# Patient Record
Sex: FEMALE | Race: WHITE | NOT HISPANIC OR LATINO | Employment: OTHER | URBAN - METROPOLITAN AREA
[De-identification: names, ages, dates, MRNs, and addresses within clinical notes are randomized per-mention and may not be internally consistent; named-entity substitution may affect disease eponyms.]

---

## 2022-04-07 ENCOUNTER — HISTORICAL (OUTPATIENT)
Dept: ADMINISTRATIVE | Facility: HOSPITAL | Age: 72
End: 2022-04-07

## 2022-04-23 VITALS
WEIGHT: 123.25 LBS | SYSTOLIC BLOOD PRESSURE: 137 MMHG | DIASTOLIC BLOOD PRESSURE: 75 MMHG | BODY MASS INDEX: 21.04 KG/M2 | HEIGHT: 64 IN

## 2023-04-18 ENCOUNTER — OFFICE VISIT (OUTPATIENT)
Dept: NEUROLOGY | Facility: CLINIC | Age: 73
End: 2023-04-18
Payer: MEDICARE

## 2023-04-18 VITALS
HEART RATE: 70 BPM | WEIGHT: 123 LBS | SYSTOLIC BLOOD PRESSURE: 138 MMHG | BODY MASS INDEX: 21 KG/M2 | HEIGHT: 64 IN | DIASTOLIC BLOOD PRESSURE: 72 MMHG

## 2023-04-18 DIAGNOSIS — G90.3 NEUROGENIC ORTHOSTATIC HYPOTENSION: Primary | ICD-10-CM

## 2023-04-18 DIAGNOSIS — F32.A DEPRESSION, UNSPECIFIED DEPRESSION TYPE: ICD-10-CM

## 2023-04-18 PROCEDURE — 99999 PR PBB SHADOW E&M-EST. PATIENT-LVL V: ICD-10-PCS | Mod: PBBFAC,,, | Performed by: PSYCHIATRY & NEUROLOGY

## 2023-04-18 PROCEDURE — 99214 OFFICE O/P EST MOD 30 MIN: CPT | Mod: S$PBB,,, | Performed by: PSYCHIATRY & NEUROLOGY

## 2023-04-18 PROCEDURE — 99999 PR PBB SHADOW E&M-EST. PATIENT-LVL V: CPT | Mod: PBBFAC,,, | Performed by: PSYCHIATRY & NEUROLOGY

## 2023-04-18 PROCEDURE — 99214 PR OFFICE/OUTPT VISIT, EST, LEVL IV, 30-39 MIN: ICD-10-PCS | Mod: S$PBB,,, | Performed by: PSYCHIATRY & NEUROLOGY

## 2023-04-18 PROCEDURE — 99215 OFFICE O/P EST HI 40 MIN: CPT | Mod: PBBFAC | Performed by: PSYCHIATRY & NEUROLOGY

## 2023-04-18 RX ORDER — LANSOPRAZOLE 30 MG/1
CAPSULE, DELAYED RELEASE ORAL
COMMUNITY

## 2023-04-18 RX ORDER — GABAPENTIN 100 MG/1
CAPSULE ORAL
COMMUNITY

## 2023-04-18 RX ORDER — CHOLECALCIFEROL (VITAMIN D3) 25 MCG
2000 TABLET ORAL DAILY
COMMUNITY

## 2023-04-18 RX ORDER — MOXIFLOXACIN 5 MG/ML
1 SOLUTION/ DROPS OPHTHALMIC 4 TIMES DAILY
COMMUNITY
Start: 2023-02-20

## 2023-04-18 RX ORDER — ESTRADIOL 0.05 MG/D
PATCH TRANSDERMAL
COMMUNITY

## 2023-04-18 RX ORDER — FLUOCINOLONE ACETONIDE 0.11 MG/ML
3 OIL AURICULAR (OTIC) NIGHTLY
COMMUNITY
Start: 2023-01-16

## 2023-04-18 RX ORDER — OFLOXACIN 3 MG/ML
SOLUTION AURICULAR (OTIC)
COMMUNITY

## 2023-04-18 RX ORDER — ESTRADIOL 0.1 MG/G
CREAM VAGINAL
COMMUNITY

## 2023-04-18 RX ORDER — FLUDROCORTISONE ACETATE 0.1 MG/1
100 TABLET ORAL DAILY
COMMUNITY
Start: 2023-01-16

## 2023-04-18 RX ORDER — VALACYCLOVIR HYDROCHLORIDE 1 G/1
TABLET, FILM COATED ORAL
COMMUNITY

## 2023-04-18 RX ORDER — FLUTICASONE PROPIONATE 50 MCG
SPRAY, SUSPENSION (ML) NASAL
COMMUNITY

## 2023-04-18 RX ORDER — BUPROPION HYDROCHLORIDE 150 MG/1
150 TABLET ORAL DAILY
Qty: 30 TABLET | Refills: 5 | Status: SHIPPED | OUTPATIENT
Start: 2023-04-18 | End: 2023-07-17

## 2023-04-18 RX ORDER — ROSUVASTATIN CALCIUM 10 MG/1
10 TABLET, COATED ORAL
COMMUNITY
Start: 2023-01-30

## 2023-04-18 RX ORDER — DICLOFENAC SODIUM 10 MG/G
GEL TOPICAL
COMMUNITY

## 2023-04-18 RX ORDER — TRAZODONE HYDROCHLORIDE 50 MG/1
TABLET ORAL
COMMUNITY
Start: 2023-02-20

## 2023-04-18 RX ORDER — EZETIMIBE 10 MG/1
TABLET ORAL
COMMUNITY

## 2023-04-18 RX ORDER — PHENAZOPYRIDINE HYDROCHLORIDE 200 MG/1
TABLET, FILM COATED ORAL
COMMUNITY

## 2023-04-18 RX ORDER — ESCITALOPRAM OXALATE 5 MG/1
5 TABLET ORAL DAILY
COMMUNITY
Start: 2023-01-30

## 2023-04-18 RX ORDER — PREDNISOLONE ACETATE 10 MG/ML
SUSPENSION/ DROPS OPHTHALMIC
COMMUNITY
Start: 2023-03-06

## 2023-04-18 RX ORDER — LIFITEGRAST 50 MG/ML
1 SOLUTION/ DROPS OPHTHALMIC 2 TIMES DAILY
COMMUNITY
Start: 2023-02-20

## 2023-04-18 RX ORDER — ALENDRONATE SODIUM 70 MG/1
70 TABLET ORAL
COMMUNITY
Start: 2023-04-13

## 2023-04-18 RX ORDER — LEVOTHYROXINE SODIUM 112 UG/1
112 TABLET ORAL
COMMUNITY
Start: 2023-01-16

## 2023-04-18 RX ORDER — ZOLPIDEM TARTRATE 5 MG/1
TABLET ORAL NIGHTLY
COMMUNITY

## 2023-04-18 RX ORDER — ASPIRIN 81 MG/1
81 TABLET ORAL
COMMUNITY

## 2023-04-18 NOTE — PROGRESS NOTES
Chief Complaint   Patient presents with    Autonomic dysfunction     Pt denies progression of symptoms such as brain fog some days worse than others dizziness denies change         This is a 72 y.o. female here for follow up for autonomic dysfunction.  She reports she feels about the same as last visit.  She still has difficulty with walking  She denies any recent falls.  She never started Northera as it was going to be too expensive.  She remains on Florinef.  She denies any urinary incontinence.  Denies tremor.  Denies memory changes.  She has both good days and bad days.  Some days she goes on outings and has no difficulty with walking and other days she feels fatigued and has to leave.  She uses a walking stick most of the time now.         Vitals:    04/18/23 1408   BP: 138/72   Pulse: 70        General: alert and oriented, No acute distress, no audible wheezes pulse intact. No edema, DIP swelling      Cognition and Comprehension  Speech and language intact.  follow commands  speech fluent  attention intact  memory for recent events intact from history taking  affect pleasant  fund of knowledge adequate      hypophonia      Cranial Nerves  II. Optic: Visual fields (Full to confrontation both eyes).  III, IV, VI. Oculomotor: Intact, Pupils equal, round and reactive to light, no nystagmus  V. Trigeminal: Sensation of light touch (Normal)  VII. Facial: no facial asymmetry.  VIII, hearing intact to spoken voice  IX/X. Glossopharyngeal/ Vagus: Voice (normal).  XI. shoulder shrug normal  XII. Hypoglossal: Intact.      Muscle Strength & Tone  Normal upper extremity tone,  Normal lower extremity tone.  Normal upper extremity strength  normal lower extremity strength              Coordination and Gait  Coordination and gait are normal.    1. Neurogenic orthostatic hypotension       Encourage salt intake and hydration.  Continue electrolyte drinks like gatorade or propel  Trial of wellbutrin  Rec autonomic consult but  deferred

## 2023-07-17 ENCOUNTER — OFFICE VISIT (OUTPATIENT)
Dept: NEUROLOGY | Facility: CLINIC | Age: 73
End: 2023-07-17
Payer: MEDICARE

## 2023-07-17 VITALS
BODY MASS INDEX: 21.17 KG/M2 | SYSTOLIC BLOOD PRESSURE: 157 MMHG | DIASTOLIC BLOOD PRESSURE: 78 MMHG | HEIGHT: 64 IN | HEART RATE: 59 BPM | WEIGHT: 124 LBS

## 2023-07-17 DIAGNOSIS — G90.3 NEUROGENIC ORTHOSTATIC HYPOTENSION: ICD-10-CM

## 2023-07-17 PROCEDURE — 99999 PR PBB SHADOW E&M-EST. PATIENT-LVL IV: ICD-10-PCS | Mod: PBBFAC,,, | Performed by: NURSE PRACTITIONER

## 2023-07-17 PROCEDURE — 99999 PR PBB SHADOW E&M-EST. PATIENT-LVL IV: CPT | Mod: PBBFAC,,, | Performed by: NURSE PRACTITIONER

## 2023-07-17 PROCEDURE — 99214 OFFICE O/P EST MOD 30 MIN: CPT | Mod: PBBFAC | Performed by: NURSE PRACTITIONER

## 2023-07-17 PROCEDURE — 99214 OFFICE O/P EST MOD 30 MIN: CPT | Mod: S$PBB,,, | Performed by: NURSE PRACTITIONER

## 2023-07-17 PROCEDURE — 99214 PR OFFICE/OUTPT VISIT, EST, LEVL IV, 30-39 MIN: ICD-10-PCS | Mod: S$PBB,,, | Performed by: NURSE PRACTITIONER

## 2023-07-17 RX ORDER — PYRIDOSTIGMINE BROMIDE 60 MG/1
30 TABLET ORAL 2 TIMES DAILY
Qty: 30 TABLET | Refills: 2 | Status: SHIPPED | OUTPATIENT
Start: 2023-07-17 | End: 2023-09-13 | Stop reason: SDUPTHER

## 2023-07-17 RX ORDER — CHOLECALCIFEROL (VITAMIN D3) 125 MCG
1 CAPSULE ORAL DAILY
COMMUNITY

## 2023-07-17 RX ORDER — MECLIZINE HYDROCHLORIDE 25 MG/1
25 TABLET ORAL 3 TIMES DAILY PRN
COMMUNITY

## 2023-07-17 NOTE — PROGRESS NOTES
Subjective:       Patient ID: Elva Alas is a 72 y.o. female.    Chief Complaint: Neurogenic orthostatic hypotension (Still having balance issues. Loses her balance. While walking favors her left side. Denies any recent fall. Uses cane to ambulate. At times gets lightheaded, when she stands up quickly. Also, while riding in a care she gets lightheaded. ) and Depression (Stop taking Wellbutrin due to developing hives. States felt better on Wellbutrin, had more energy.  Only taking Lexapro 5 mg daily.)      History of Present Illness:  This is a 72 y.o. female here for follow up for autonomic dysfunction.  She reports she feels about the same as last visit.  She still has difficulty with walking  She denies any recent falls.  She remains on Florinef.  She denies any urinary incontinence.  Denies tremor.  Denies memory changes.  She has both good days and bad days.  Some days she goes on outings and has no difficulty with walking and other days she feels fatigued and has to leave.  She uses a walking stick most of the time now.  She was started on Wellbutrin at last visit, but developed an atypical hives-like rash about 2 weeks after starting it so it was discontinued.  She has an appt with an immunologist soon because of the atypical nature of the rash      Review of Systems  I have reviewed a 12 point ROS which is negative unless otherwise stated in HPI        Past Medical History:   Diagnosis Date    Autonomic dysfunction     Depression     Deviated septum     History of repair of retinal tear by laser photocoagulation     Hyperlipidemia     Hypothyroid     Insomnia        Past Surgical History:   Procedure Laterality Date    ADRENALECTOMY      EYE SURGERY      FOOT SURGERY Left     HEMORRHOID SURGERY      HYSTERECTOMY          Family History   Problem Relation Age of Onset    Aneurysm Mother     Pulmonary embolism Mother     Cancer Father     Stroke Sister     Cancer Sister         Social History      Socioeconomic History    Marital status:    Tobacco Use    Smoking status: Former     Types: Cigarettes    Smokeless tobacco: Never   Substance and Sexual Activity    Alcohol use: Yes     Comment: Occasionally    Drug use: Never        Outpatient Encounter Medications as of 2023   Medication Sig Dispense Refill    diclofenac sodium (VOLTAREN) 1 % Gel diclofenac 1 % topical gel   APPLY 1 GRAM TO PAINFUL AREA FOUR TIMES DAILY AS NEEDED      EScitalopram oxalate (LEXAPRO) 5 MG Tab Take 5 mg by mouth once daily.      fludrocortisone (FLORINEF) 0.1 mg Tab Take 100 mcg by mouth once daily.      fluocinolone acetonide oiL 0.01 % Drop Place 3 drops into both ears every evening.      fluticasone propionate (FLONASE) 50 mcg/actuation nasal spray Flonase Allergy Relief 50 mcg/actuation nasal spray,suspension   Inhale 2 sprays every day by intranasal route.      lactase (LACTAID) 3,000 unit tablet Take 1 tablet by mouth once daily.      levothyroxine (SYNTHROID) 112 MCG tablet Take 112 mcg by mouth before breakfast.      meclizine (MOTION SICKNESS, MECLIZINE,) 25 mg tablet Take 25 mg by mouth 3 (three) times daily as needed.      rosuvastatin (CRESTOR) 10 MG tablet Take 10 mg by mouth twice a week.      traZODone (DESYREL) 50 MG tablet SMARTSI-3 Tablet(s) By Mouth Every Evening PRN      vitamin D (VITAMIN D3) 1000 units Tab Take 2,000 Units by mouth once daily.      XIIDRA 5 % Dpet Place 1 drop into both eyes 2 (two) times daily.      alendronate (FOSAMAX) 70 MG tablet Take 70 mg by mouth every 7 days.      aspirin (ECOTRIN) 81 MG EC tablet Take 81 mg by mouth.      estradioL (ESTRACE) 0.01 % (0.1 mg/gram) vaginal cream Estrace 0.01% (0.1 mg/gram) vaginal cream      estradiol 0.05 mg/24 hr td ptwk 0.05 mg/24 hr PTWK Climara 0.05 mg/24 hr transdermal patch      ezetimibe (ZETIA) 10 mg tablet Zetia 10 mg tablet      gabapentin (NEURONTIN) 100 MG capsule gabapentin 100 mg capsule      lansoprazole (PREVACID) 30  "MG capsule lansoprazole 30 mg capsule,delayed release      moxifloxacin (VIGAMOX) 0.5 % ophthalmic solution Place 1 drop into the left eye 4 (four) times daily.      ofloxacin (FLOXIN) 0.3 % otic solution ofloxacin 0.3 % ear drops      phenazopyridine (PYRIDIUM) 200 MG tablet phenazopyridine 200 mg tablet      prednisoLONE acetate (PRED FORTE) 1 % DrpS Place into both eyes.      pyridostigmine (MESTINON) 60 mg Tab Take 0.5 tablets (30 mg total) by mouth 2 (two) times a day. 30 tablet 2    valACYclovir (VALTREX) 1000 MG tablet valacyclovir 1 gram tablet      zolpidem (AMBIEN) 5 MG Tab Take by mouth every evening. 0.5 tab      zoster vaccine live, PF, (ZOSTAVAX, PF,) 19,400 unit/0.65 mL injection Zostavax (PF) 19,400 unit/0.65 mL subcutaneous suspension      [DISCONTINUED] buPROPion (WELLBUTRIN XL) 150 MG TB24 tablet Take 1 tablet (150 mg total) by mouth once daily. (Patient not taking: Reported on 7/17/2023) 30 tablet 5     No facility-administered encounter medications on file as of 7/17/2023.      Objective:   BP (!) 157/78 (BP Location: Left arm) Comment: Orthostatic: Lying 155/81, P61, Sitting 154/83, P63, Standing 137/77, P 67  Pulse (!) 59   Ht 5' 4" (1.626 m)   Wt 56.2 kg (124 lb)   BMI 21.28 kg/m²        Physical Exam  NAD  alert and oriented  cognition and perception intact  no aphasia   hypophonia  EOMI  no facial asymmetry  no dysarthria  moves all extremities symmetrically  no gross coordination abnormalities  gait normal with cane      Assessment & Plan:      1. Neurogenic orthostatic hypotension  Assessment & Plan:  -Wellbutrin presumably caused severe rash.  Trial of mestinon.  Start at 30 mg BID    Orders:  -     pyridostigmine (MESTINON) 60 mg Tab; Take 0.5 tablets (30 mg total) by mouth 2 (two) times a day.  Dispense: 30 tablet; Refill: 2          This note was created with the assistance of voice recognition software. There may be transcription errors as a result of using this technology " however minimal. Effort has been made to assure accuracy of transcription but any obvious errors or omissions should be clarified with the author of the document.

## 2023-09-13 DIAGNOSIS — G90.3 NEUROGENIC ORTHOSTATIC HYPOTENSION: ICD-10-CM

## 2023-09-13 RX ORDER — PYRIDOSTIGMINE BROMIDE 60 MG/1
30 TABLET ORAL 2 TIMES DAILY
Qty: 30 TABLET | Refills: 2 | Status: SHIPPED | OUTPATIENT
Start: 2023-09-13 | End: 2024-01-08

## 2023-11-07 ENCOUNTER — OFFICE VISIT (OUTPATIENT)
Dept: NEUROLOGY | Facility: CLINIC | Age: 73
End: 2023-11-07
Payer: MEDICARE

## 2023-11-07 VITALS
HEART RATE: 72 BPM | WEIGHT: 124 LBS | DIASTOLIC BLOOD PRESSURE: 66 MMHG | SYSTOLIC BLOOD PRESSURE: 134 MMHG | HEIGHT: 64 IN | BODY MASS INDEX: 21.17 KG/M2

## 2023-11-07 DIAGNOSIS — G90.3 NEUROGENIC ORTHOSTATIC HYPOTENSION: Primary | ICD-10-CM

## 2023-11-07 PROCEDURE — 99999 PR PBB SHADOW E&M-EST. PATIENT-LVL IV: CPT | Mod: PBBFAC,,, | Performed by: NURSE PRACTITIONER

## 2023-11-07 PROCEDURE — 99999 PR PBB SHADOW E&M-EST. PATIENT-LVL IV: ICD-10-PCS | Mod: PBBFAC,,, | Performed by: NURSE PRACTITIONER

## 2023-11-07 PROCEDURE — 99213 OFFICE O/P EST LOW 20 MIN: CPT | Mod: S$PBB,,, | Performed by: NURSE PRACTITIONER

## 2023-11-07 PROCEDURE — 99214 OFFICE O/P EST MOD 30 MIN: CPT | Mod: PBBFAC | Performed by: NURSE PRACTITIONER

## 2023-11-07 PROCEDURE — 99213 PR OFFICE/OUTPT VISIT, EST, LEVL III, 20-29 MIN: ICD-10-PCS | Mod: S$PBB,,, | Performed by: NURSE PRACTITIONER

## 2023-11-07 NOTE — PROGRESS NOTES
Subjective:       Patient ID: Elva Alas is a 72 y.o. female.    Chief Complaint: Neurogenic orthostatic hypotension (Balance is much better since she started on pyridostigmine. States she was able to give up on her cane. Denies any falls. Also, did complete a session of PT for 7 weeks which has helped. States she exercise at home. )      History of Present Illness:  Follow-up visit for autonomic dysfunction.  She was started on a trial of Mestinon at last visit.  She is currently taking 30 mg b.i.d. and has had significant improvement in her symptoms.  Her orthostatics today were essentially unchanged with positions.  She denies any side effects since being on the Mestinon.  She specifically denies any GI upset.  She still has some very mild orthostasis symptoms when she bends down and stands up, but this is very much tolerable for her.  She is no longer having to use a cane.    Lying /70, pulse 76   Sitting /70, pulse 76  Standing /72, pulse 72        Review of Systems  I have reviewed a 12 point review of systems which is negative unless otherwise stated in HPI        Past Medical History:   Diagnosis Date    Autonomic dysfunction     Depression     Deviated septum     History of repair of retinal tear by laser photocoagulation     Hyperlipidemia     Hypothyroid     Insomnia        Past Surgical History:   Procedure Laterality Date    ADRENALECTOMY      EYE SURGERY      FOOT SURGERY Left     HEMORRHOID SURGERY      HYSTERECTOMY          Family History   Problem Relation Age of Onset    Aneurysm Mother     Pulmonary embolism Mother     Cancer Father     Stroke Sister     Cancer Sister         Social History     Socioeconomic History    Marital status:    Tobacco Use    Smoking status: Former     Types: Cigarettes    Smokeless tobacco: Never   Substance and Sexual Activity    Alcohol use: Yes     Comment: Occasionally    Drug use: Never        Outpatient Encounter Medications as  of 2023   Medication Sig Dispense Refill    diclofenac sodium (VOLTAREN) 1 % Gel diclofenac 1 % topical gel   APPLY 1 GRAM TO PAINFUL AREA FOUR TIMES DAILY AS NEEDED      EScitalopram oxalate (LEXAPRO) 5 MG Tab Take 5 mg by mouth once daily.      fludrocortisone (FLORINEF) 0.1 mg Tab Take 100 mcg by mouth once daily.      fluocinolone acetonide oiL 0.01 % Drop Place 3 drops into both ears every evening.      fluticasone propionate (FLONASE) 50 mcg/actuation nasal spray Flonase Allergy Relief 50 mcg/actuation nasal spray,suspension   Inhale 2 sprays every day by intranasal route.      lactase (LACTAID) 3,000 unit tablet Take 1 tablet by mouth once daily.      levothyroxine (SYNTHROID) 112 MCG tablet Take 112 mcg by mouth before breakfast.      meclizine (MOTION SICKNESS, MECLIZINE,) 25 mg tablet Take 25 mg by mouth 3 (three) times daily as needed.      pyridostigmine (MESTINON) 60 mg Tab Take 0.5 tablets (30 mg total) by mouth 2 (two) times a day. 30 tablet 2    rosuvastatin (CRESTOR) 10 MG tablet Take 10 mg by mouth twice a week.      traZODone (DESYREL) 50 MG tablet SMARTSI-3 Tablet(s) By Mouth Every Evening PRN      vitamin D (VITAMIN D3) 1000 units Tab Take 2,000 Units by mouth once daily.      XIIDRA 5 % Dpet Place 1 drop into both eyes 2 (two) times daily.      alendronate (FOSAMAX) 70 MG tablet Take 70 mg by mouth every 7 days.      aspirin (ECOTRIN) 81 MG EC tablet Take 81 mg by mouth.      estradioL (ESTRACE) 0.01 % (0.1 mg/gram) vaginal cream Estrace 0.01% (0.1 mg/gram) vaginal cream      estradiol 0.05 mg/24 hr td ptwk 0.05 mg/24 hr PTWK Climara 0.05 mg/24 hr transdermal patch      ezetimibe (ZETIA) 10 mg tablet Zetia 10 mg tablet      gabapentin (NEURONTIN) 100 MG capsule gabapentin 100 mg capsule      lansoprazole (PREVACID) 30 MG capsule lansoprazole 30 mg capsule,delayed release      moxifloxacin (VIGAMOX) 0.5 % ophthalmic solution Place 1 drop into the left eye 4 (four) times daily.       "ofloxacin (FLOXIN) 0.3 % otic solution ofloxacin 0.3 % ear drops      phenazopyridine (PYRIDIUM) 200 MG tablet phenazopyridine 200 mg tablet      prednisoLONE acetate (PRED FORTE) 1 % DrpS Place into both eyes.      valACYclovir (VALTREX) 1000 MG tablet valacyclovir 1 gram tablet      zolpidem (AMBIEN) 5 MG Tab Take by mouth every evening. 0.5 tab      zoster vaccine live, PF, (ZOSTAVAX, PF,) 19,400 unit/0.65 mL injection Zostavax (PF) 19,400 unit/0.65 mL subcutaneous suspension       No facility-administered encounter medications on file as of 11/7/2023.      Objective:   /66 (BP Location: Right arm)   Pulse 72   Ht 5' 4" (1.626 m)   Wt 56.2 kg (124 lb)   BMI 21.28 kg/m²        Physical Exam  General:  Alert and oriented  NAD  No overt edema    Cognition and Comprehension:  Speech and language intact  Follows commands  Hypophonia    Cranial nerves:   CN 2_ Visual fields (full to confrontation both eyes)  CN 3, 4, 6_ Intact, CONNIE, no nystagmus  CN 7_no face asymmetry; normal eye closure and smile  CN 8_hearing intact to spoken voice  CN 9, 10, 11_voice normal, shoulder shrug ok; deltoids not fatigable     Muscle Strength and Tone:  Normal upper extremity tone  Normal lower extremity tone  Normal upper extremity strength  Normal lower extremity strength    Coordination and Gait:  Stride length and pace normal, no instability with turn  No ataxia with FTN      Assessment & Plan:      1. Neurogenic orthostatic hypotension  Assessment & Plan:  -significant improvement with addition of Mestinon 30 mg b.i.d..  Recommend continuing at this dose for now  -RTC 6 months, but call for sooner appointment if needed            This note was created with the assistance of voice recognition software. There may be transcription errors as a result of using this technology however minimal. Effort has been made to assure accuracy of transcription but any obvious errors or omissions should be clarified with the author of the " document.

## 2023-11-07 NOTE — ASSESSMENT & PLAN NOTE
-significant improvement with addition of Mestinon 30 mg b.i.d..  Recommend continuing at this dose for now  -RTC 6 months, but call for sooner appointment if needed

## 2024-01-08 DIAGNOSIS — G90.3 NEUROGENIC ORTHOSTATIC HYPOTENSION: ICD-10-CM

## 2024-01-08 RX ORDER — PYRIDOSTIGMINE BROMIDE 60 MG/1
30 TABLET ORAL 2 TIMES DAILY
Qty: 30 TABLET | Refills: 2 | Status: SHIPPED | OUTPATIENT
Start: 2024-01-08

## 2024-03-12 DIAGNOSIS — G90.3 NEUROGENIC ORTHOSTATIC HYPOTENSION: ICD-10-CM

## 2024-03-12 RX ORDER — PYRIDOSTIGMINE BROMIDE 60 MG/1
30 TABLET ORAL 2 TIMES DAILY
Qty: 30 TABLET | Refills: 2 | Status: SHIPPED | OUTPATIENT
Start: 2024-03-12 | End: 2024-05-15 | Stop reason: SDUPTHER

## 2024-05-15 ENCOUNTER — OFFICE VISIT (OUTPATIENT)
Dept: NEUROLOGY | Facility: CLINIC | Age: 74
End: 2024-05-15
Payer: MEDICARE

## 2024-05-15 VITALS
BODY MASS INDEX: 20.35 KG/M2 | WEIGHT: 119.19 LBS | DIASTOLIC BLOOD PRESSURE: 60 MMHG | HEIGHT: 64 IN | SYSTOLIC BLOOD PRESSURE: 120 MMHG | HEART RATE: 80 BPM

## 2024-05-15 DIAGNOSIS — G90.3 NEUROGENIC ORTHOSTATIC HYPOTENSION: Primary | ICD-10-CM

## 2024-05-15 PROCEDURE — 99214 OFFICE O/P EST MOD 30 MIN: CPT | Mod: S$PBB,,, | Performed by: NURSE PRACTITIONER

## 2024-05-15 PROCEDURE — 99213 OFFICE O/P EST LOW 20 MIN: CPT | Mod: PBBFAC | Performed by: NURSE PRACTITIONER

## 2024-05-15 PROCEDURE — 99999 PR PBB SHADOW E&M-EST. PATIENT-LVL III: CPT | Mod: PBBFAC,,, | Performed by: NURSE PRACTITIONER

## 2024-05-15 RX ORDER — PYRIDOSTIGMINE BROMIDE 60 MG/1
30 TABLET ORAL 2 TIMES DAILY
Qty: 90 TABLET | Refills: 3 | Status: SHIPPED | OUTPATIENT
Start: 2024-05-15

## 2024-05-15 NOTE — PROGRESS NOTES
Subjective:       Patient ID: Elva Alas is a 73 y.o. female.    Chief Complaint: Autonomic dysfunction (Pt states frequent balance issues issues as well as some slight dizziness )      History of Present Illness:  Follow-up visit for autonomic dysfunction.  She is still on Mestinon 30 mg b.i.d..  She was still doing well until about 4 weeks ago when she began to experience more issues with balance difficulties and lightheadedness and dizziness.  She does recall drinking propel and it helped to improve her symptoms.  She became really depressed because of the exacerbation of her symptoms, but about a week ago, the symptoms began to returned to their baseline and she feels back to normal.  Her orthostatics were not positive today, but baseline blood pressure is a little bit lower than it was at her previous visit.  She is unsure what provoked the exacerbation.  Denies any illness.  Does report that she is sensitive to heat    Lying /60, pulse 74   Sitting /60, pulse 80  Standing /64, pulse 80            Past Medical History:   Diagnosis Date    Autonomic dysfunction     Depression     Deviated septum     History of repair of retinal tear by laser photocoagulation     Hyperlipidemia     Hypothyroid     Insomnia        Past Surgical History:   Procedure Laterality Date    ADRENALECTOMY      EYE SURGERY      FOOT SURGERY Left     HEMORRHOID SURGERY      HYSTERECTOMY          Family History   Problem Relation Name Age of Onset    Aneurysm Mother      Pulmonary embolism Mother      Cancer Father      Stroke Sister      Cancer Sister          Social History     Socioeconomic History    Marital status:    Tobacco Use    Smoking status: Former     Types: Cigarettes    Smokeless tobacco: Never   Substance and Sexual Activity    Alcohol use: Yes     Comment: Occasionally    Drug use: Never        Outpatient Encounter Medications as of 5/15/2024   Medication Sig Dispense Refill    EScitalopram  oxalate (LEXAPRO) 5 MG Tab Take 5 mg by mouth once daily.      fludrocortisone (FLORINEF) 0.1 mg Tab Take 100 mcg by mouth once daily.      fluocinolone acetonide oiL 0.01 % Drop Place 3 drops into both ears every evening.      fluticasone propionate (FLONASE) 50 mcg/actuation nasal spray Flonase Allergy Relief 50 mcg/actuation nasal spray,suspension   Inhale 2 sprays every day by intranasal route.      lactase (LACTAID) 3,000 unit tablet Take 1 tablet by mouth once daily.      levothyroxine (SYNTHROID) 112 MCG tablet Take 112 mcg by mouth before breakfast.      meclizine (MOTION SICKNESS, MECLIZINE,) 25 mg tablet Take 25 mg by mouth 3 (three) times daily as needed.      rosuvastatin (CRESTOR) 10 MG tablet Take 10 mg by mouth twice a week.      traZODone (DESYREL) 50 MG tablet 100 mg.      valACYclovir (VALTREX) 1000 MG tablet valacyclovir 1 gram tablet      vitamin D (VITAMIN D3) 1000 units Tab Take 2,000 Units by mouth once daily.      XIIDRA 5 % Dpet Place 1 drop into both eyes 2 (two) times daily.      [DISCONTINUED] alendronate (FOSAMAX) 70 MG tablet Take 70 mg by mouth every 7 days.      [DISCONTINUED] lansoprazole (PREVACID) 30 MG capsule lansoprazole 30 mg capsule,delayed release      [DISCONTINUED] moxifloxacin (VIGAMOX) 0.5 % ophthalmic solution Place 1 drop into the left eye 4 (four) times daily.      [DISCONTINUED] pyridostigmine (MESTINON) 60 mg Tab Take 0.5 tablets (30 mg total) by mouth 2 (two) times daily. 30 tablet 2    pyridostigmine (MESTINON) 60 mg Tab Take 0.5 tablets (30 mg total) by mouth 2 (two) times daily. 90 tablet 3    [DISCONTINUED] aspirin (ECOTRIN) 81 MG EC tablet Take 81 mg by mouth.      [DISCONTINUED] diclofenac sodium (VOLTAREN) 1 % Gel diclofenac 1 % topical gel   APPLY 1 GRAM TO PAINFUL AREA FOUR TIMES DAILY AS NEEDED      [DISCONTINUED] estradioL (ESTRACE) 0.01 % (0.1 mg/gram) vaginal cream Estrace 0.01% (0.1 mg/gram) vaginal cream      [DISCONTINUED] estradiol 0.05 mg/24 hr td  "ptwk 0.05 mg/24 hr PTWK Climara 0.05 mg/24 hr transdermal patch      [DISCONTINUED] ezetimibe (ZETIA) 10 mg tablet Zetia 10 mg tablet      [DISCONTINUED] gabapentin (NEURONTIN) 100 MG capsule gabapentin 100 mg capsule      [DISCONTINUED] ofloxacin (FLOXIN) 0.3 % otic solution ofloxacin 0.3 % ear drops      [DISCONTINUED] phenazopyridine (PYRIDIUM) 200 MG tablet phenazopyridine 200 mg tablet      [DISCONTINUED] prednisoLONE acetate (PRED FORTE) 1 % DrpS Place into both eyes.      [DISCONTINUED] zolpidem (AMBIEN) 5 MG Tab Take by mouth every evening. 0.5 tab      [DISCONTINUED] zoster vaccine live, PF, (ZOSTAVAX, PF,) 19,400 unit/0.65 mL injection Zostavax (PF) 19,400 unit/0.65 mL subcutaneous suspension       No facility-administered encounter medications on file as of 5/15/2024.      Objective:   /60   Pulse 80   Ht 5' 4" (1.626 m)   Wt 54.1 kg (119 lb 3.2 oz)   BMI 20.46 kg/m²        Physical Exam  General:  Alert and oriented  NAD  No overt edema    Cognition and Comprehension:  Speech and language intact  Follows commands    Cranial nerves:   CN 2_ Visual fields (full to confrontation both eyes)  CN 3, 4, 6_ Intact, CONNIE, no nystagmus  CN 5_facial tactile sensation intact  CN 7_no face asymmetry; normal eye closure and smile  CN 8_hearing intact to spoken voice  CN 9, 10, 11_voice normal, shoulder shrug ok; deltoids not fatigable   CN 12 tongue_protrudes mid line    Muscle Strength and Tone:  Normal upper extremity tone  Normal lower extremity tone  Normal upper extremity strength  Normal lower extremity strength  No bradykinesia  hypophonia    Reflexes:  Normal and symmetric    Sensation:  Intact to light touch and temperature    Coordination and Gait:  Coordination and gait are normal   No ataxia      Assessment & Plan:      1. Neurogenic orthostatic hypotension  Assessment & Plan:  -had about a 3 week exacerbation of symptoms, but now doing better and back to baseline.  Continue mestinon 30 mg BID.  " Discussed possibility for temporary increase for periodic exacerbations if needed.  Encourage electrolyte drinks    Orders:  -     pyridostigmine (MESTINON) 60 mg Tab; Take 0.5 tablets (30 mg total) by mouth 2 (two) times daily.  Dispense: 90 tablet; Refill: 3          This note was created with the assistance of voice recognition software. There may be transcription errors as a result of using this technology however minimal. Effort has been made to assure accuracy of transcription but any obvious errors or omissions should be clarified with the author of the document.

## 2024-05-15 NOTE — ASSESSMENT & PLAN NOTE
-had about a 3 week exacerbation of symptoms, but now doing better and back to baseline.  Continue mestinon 30 mg BID.  Discussed possibility for temporary increase for periodic exacerbations if needed.  Encourage electrolyte drinks

## 2024-12-10 ENCOUNTER — OFFICE VISIT (OUTPATIENT)
Dept: NEUROLOGY | Facility: CLINIC | Age: 74
End: 2024-12-10
Payer: MEDICARE

## 2024-12-10 VITALS
DIASTOLIC BLOOD PRESSURE: 66 MMHG | HEIGHT: 64 IN | WEIGHT: 117 LBS | SYSTOLIC BLOOD PRESSURE: 130 MMHG | HEART RATE: 80 BPM | BODY MASS INDEX: 19.97 KG/M2

## 2024-12-10 DIAGNOSIS — G90.3 NEUROGENIC ORTHOSTATIC HYPOTENSION: Primary | ICD-10-CM

## 2024-12-10 PROCEDURE — 99214 OFFICE O/P EST MOD 30 MIN: CPT | Mod: S$PBB,,, | Performed by: PSYCHIATRY & NEUROLOGY

## 2024-12-10 PROCEDURE — 99999 PR PBB SHADOW E&M-EST. PATIENT-LVL IV: CPT | Mod: PBBFAC,,, | Performed by: PSYCHIATRY & NEUROLOGY

## 2024-12-10 PROCEDURE — 99214 OFFICE O/P EST MOD 30 MIN: CPT | Mod: PBBFAC | Performed by: PSYCHIATRY & NEUROLOGY

## 2024-12-10 NOTE — PROGRESS NOTES
Chief Complaint   Patient presents with    Autonomic dysfunction     States has noticed a slight increase in her blood pressures. So far has not having any fainting spells. Some days balance is off. For the most part dizziness and lightheadedness has improved a little with the mestinon.         This is a 74 y.o. female here for follow up for autonomic dysfunction.  She has noticed a slight increase in her blood pressures.  She does feel the Mestinon is helping.  She is taking 30 mg twice daily.  She denies any side effects.  This specific situation where dizziness is the worse as she is riding in the car for long period of time when she goes to get out of the car.  She denies any new symptoms like tremor, diplopia, dysarthria, slowness.    Medication List with Changes/Refills   Current Medications    ESCITALOPRAM OXALATE (LEXAPRO) 5 MG TAB    Take 5 mg by mouth once daily.    FLUDROCORTISONE (FLORINEF) 0.1 MG TAB    Take 100 mcg by mouth once daily.    FLUOCINOLONE ACETONIDE OIL 0.01 % DROP    Place 3 drops into both ears every evening.    FLUTICASONE PROPIONATE (FLONASE) 50 MCG/ACTUATION NASAL SPRAY    Flonase Allergy Relief 50 mcg/actuation nasal spray,suspension   Inhale 2 sprays every day by intranasal route.    LACTASE (LACTAID) 3,000 UNIT TABLET    Take 1 tablet by mouth once daily.    LEVOTHYROXINE (SYNTHROID) 112 MCG TABLET    Take 112 mcg by mouth before breakfast.    MECLIZINE (MOTION SICKNESS, MECLIZINE,) 25 MG TABLET    Take 25 mg by mouth 3 (three) times daily as needed.    PYRIDOSTIGMINE (MESTINON) 60 MG TAB    Take 0.5 tablets (30 mg total) by mouth 2 (two) times daily.    ROSUVASTATIN (CRESTOR) 10 MG TABLET    Take 10 mg by mouth twice a week.    TRAZODONE (DESYREL) 50 MG TABLET    Take 100 mg by mouth every evening.    UNABLE TO FIND    Take 4 capsules by mouth once daily. Nutrafold    VALACYCLOVIR (VALTREX) 1000 MG TABLET    valacyclovir 1 gram tablet    VITAMIN D (VITAMIN D3) 1000 UNITS TAB     Take 2,000 Units by mouth once daily.    XIIDRA 5 % DPET    Place 1 drop into both eyes 2 (two) times daily.        Vitals:    12/10/24 1450   BP: 130/66   Pulse:         NAD  Alert and oriented  Cognition and perception intact  No aphasia  EOMI  No facial asymmetry  No dysarthria  Moves all extremities symmetrically  No gross coordination abnormalities  Gait normal    Mild bradykinesia noted    1. Neurogenic orthostatic hypotension       Patient seems to be responding to the Mestinon. would continue.  Mild bradykinesia was noted on exam but no overt signs of Parkinson's or MSA at this time

## 2025-02-15 DIAGNOSIS — G90.3 NEUROGENIC ORTHOSTATIC HYPOTENSION: ICD-10-CM

## 2025-02-18 RX ORDER — PYRIDOSTIGMINE BROMIDE 60 MG/1
30 TABLET ORAL 2 TIMES DAILY
Qty: 90 TABLET | Refills: 3 | Status: SHIPPED | OUTPATIENT
Start: 2025-02-18